# Patient Record
Sex: FEMALE | URBAN - METROPOLITAN AREA
[De-identification: names, ages, dates, MRNs, and addresses within clinical notes are randomized per-mention and may not be internally consistent; named-entity substitution may affect disease eponyms.]

---

## 2021-05-11 ENCOUNTER — OFFICE VISIT (OUTPATIENT)
Dept: URBAN - METROPOLITAN AREA CLINIC 26 | Facility: CLINIC | Age: 36
End: 2021-05-11
Payer: COMMERCIAL

## 2021-05-11 DIAGNOSIS — H04.123 TEAR FILM INSUFFICIENCY OF BILATERAL LACRIMAL GLANDS: ICD-10-CM

## 2021-05-11 DIAGNOSIS — H50.811 DUANE'S SYNDROME, RIGHT EYE: Primary | ICD-10-CM

## 2021-05-11 PROCEDURE — 92134 CPTRZ OPH DX IMG PST SGM RTA: CPT | Performed by: OPTOMETRIST

## 2021-05-11 PROCEDURE — 92004 COMPRE OPH EXAM NEW PT 1/>: CPT | Performed by: OPTOMETRIST

## 2021-05-11 ASSESSMENT — VISUAL ACUITY
OS: 20/20
OD: 20/20

## 2021-05-11 ASSESSMENT — INTRAOCULAR PRESSURE
OS: 14
OD: 14

## 2021-05-11 ASSESSMENT — KERATOMETRY
OD: 43.00
OS: 42.88

## 2021-05-11 NOTE — IMPRESSION/PLAN
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 pt c/o neck discomfort and increased head tilt/turn to compensate. pt interested in tx to help reduce head turn. recommend next available consult with Dr. Chintan Sanders.